# Patient Record
Sex: MALE | Race: BLACK OR AFRICAN AMERICAN | HISPANIC OR LATINO | Employment: FULL TIME | URBAN - METROPOLITAN AREA
[De-identification: names, ages, dates, MRNs, and addresses within clinical notes are randomized per-mention and may not be internally consistent; named-entity substitution may affect disease eponyms.]

---

## 2020-08-24 ENCOUNTER — HOSPITAL ENCOUNTER (EMERGENCY)
Facility: HOSPITAL | Age: 55
Discharge: HOME | End: 2020-08-24
Attending: EMERGENCY MEDICINE
Payer: OTHER GOVERNMENT

## 2020-08-24 VITALS
BODY MASS INDEX: 30.16 KG/M2 | DIASTOLIC BLOOD PRESSURE: 68 MMHG | RESPIRATION RATE: 18 BRPM | HEART RATE: 77 BPM | WEIGHT: 235 LBS | SYSTOLIC BLOOD PRESSURE: 130 MMHG | HEIGHT: 74 IN | TEMPERATURE: 99 F | OXYGEN SATURATION: 97 %

## 2020-08-24 DIAGNOSIS — U07.1 COVID-19 VIRUS DETECTED: ICD-10-CM

## 2020-08-24 DIAGNOSIS — R53.83 FATIGUE: ICD-10-CM

## 2020-08-24 DIAGNOSIS — U07.1 COVID-19 VIRUS INFECTION: Primary | ICD-10-CM

## 2020-08-24 LAB
ALBUMIN SERPL BCP-MCNC: 4.1 G/DL (ref 3.5–5.2)
ALP SERPL-CCNC: 56 U/L (ref 55–135)
ALT SERPL W/O P-5'-P-CCNC: 20 U/L (ref 10–44)
ANION GAP SERPL CALC-SCNC: 10 MMOL/L (ref 8–16)
AST SERPL-CCNC: 28 U/L (ref 10–40)
BASOPHILS # BLD AUTO: 0.01 K/UL (ref 0–0.2)
BASOPHILS NFR BLD: 0.3 % (ref 0–1.9)
BILIRUB SERPL-MCNC: 0.3 MG/DL (ref 0.1–1)
BILIRUB UR QL STRIP: NEGATIVE
BUN SERPL-MCNC: 10 MG/DL (ref 6–20)
CALCIUM SERPL-MCNC: 8.5 MG/DL (ref 8.7–10.5)
CHLORIDE SERPL-SCNC: 107 MMOL/L (ref 95–110)
CK SERPL-CCNC: 464 U/L (ref 20–200)
CLARITY UR: CLEAR
CO2 SERPL-SCNC: 21 MMOL/L (ref 23–29)
COLOR UR: YELLOW
CREAT SERPL-MCNC: 1.2 MG/DL (ref 0.5–1.4)
DIFFERENTIAL METHOD: ABNORMAL
EOSINOPHIL # BLD AUTO: 0 K/UL (ref 0–0.5)
EOSINOPHIL NFR BLD: 0.9 % (ref 0–8)
ERYTHROCYTE [DISTWIDTH] IN BLOOD BY AUTOMATED COUNT: 13.7 % (ref 11.5–14.5)
EST. GFR  (AFRICAN AMERICAN): >60 ML/MIN/1.73 M^2
EST. GFR  (NON AFRICAN AMERICAN): >60 ML/MIN/1.73 M^2
GLUCOSE SERPL-MCNC: 99 MG/DL (ref 70–110)
GLUCOSE UR QL STRIP: NEGATIVE
HCT VFR BLD AUTO: 43.7 % (ref 40–54)
HGB BLD-MCNC: 15.1 G/DL (ref 14–18)
HGB UR QL STRIP: NEGATIVE
IMM GRANULOCYTES # BLD AUTO: 0.01 K/UL (ref 0–0.04)
IMM GRANULOCYTES NFR BLD AUTO: 0.3 % (ref 0–0.5)
KETONES UR QL STRIP: NEGATIVE
LEUKOCYTE ESTERASE UR QL STRIP: NEGATIVE
LYMPHOCYTES # BLD AUTO: 0.7 K/UL (ref 1–4.8)
LYMPHOCYTES NFR BLD: 22.5 % (ref 18–48)
MAGNESIUM SERPL-MCNC: 2.2 MG/DL (ref 1.6–2.6)
MCH RBC QN AUTO: 32.8 PG (ref 27–31)
MCHC RBC AUTO-ENTMCNC: 34.6 G/DL (ref 32–36)
MCV RBC AUTO: 95 FL (ref 82–98)
MICROSCOPIC COMMENT: NORMAL
MONOCYTES # BLD AUTO: 0.6 K/UL (ref 0.3–1)
MONOCYTES NFR BLD: 18.2 % (ref 4–15)
NEUTROPHILS # BLD AUTO: 1.9 K/UL (ref 1.8–7.7)
NEUTROPHILS NFR BLD: 57.8 % (ref 38–73)
NITRITE UR QL STRIP: NEGATIVE
NRBC BLD-RTO: 0 /100 WBC
PH UR STRIP: 5 [PH] (ref 5–8)
PHOSPHATE SERPL-MCNC: 2.7 MG/DL (ref 2.7–4.5)
PLATELET # BLD AUTO: 160 K/UL (ref 150–350)
PMV BLD AUTO: 10 FL (ref 9.2–12.9)
POTASSIUM SERPL-SCNC: 4.5 MMOL/L (ref 3.5–5.1)
PROT SERPL-MCNC: 7.2 G/DL (ref 6–8.4)
PROT UR QL STRIP: NEGATIVE
RBC # BLD AUTO: 4.61 M/UL (ref 4.6–6.2)
SARS-COV-2 RDRP RESP QL NAA+PROBE: POSITIVE
SODIUM SERPL-SCNC: 138 MMOL/L (ref 136–145)
SP GR UR STRIP: 1.01 (ref 1–1.03)
TROPONIN I SERPL DL<=0.01 NG/ML-MCNC: <0.006 NG/ML (ref 0–0.03)
URN SPEC COLLECT METH UR: NORMAL
UROBILINOGEN UR STRIP-ACNC: NEGATIVE EU/DL
WBC # BLD AUTO: 3.29 K/UL (ref 3.9–12.7)

## 2020-08-24 PROCEDURE — 83735 ASSAY OF MAGNESIUM: CPT

## 2020-08-24 PROCEDURE — 81000 URINALYSIS NONAUTO W/SCOPE: CPT

## 2020-08-24 PROCEDURE — U0002 COVID-19 LAB TEST NON-CDC: HCPCS

## 2020-08-24 PROCEDURE — 82550 ASSAY OF CK (CPK): CPT

## 2020-08-24 PROCEDURE — 80053 COMPREHEN METABOLIC PANEL: CPT

## 2020-08-24 PROCEDURE — 96360 HYDRATION IV INFUSION INIT: CPT

## 2020-08-24 PROCEDURE — 93010 ELECTROCARDIOGRAM REPORT: CPT | Mod: ,,, | Performed by: INTERNAL MEDICINE

## 2020-08-24 PROCEDURE — 84484 ASSAY OF TROPONIN QUANT: CPT

## 2020-08-24 PROCEDURE — 25000003 PHARM REV CODE 250: Performed by: EMERGENCY MEDICINE

## 2020-08-24 PROCEDURE — 99285 EMERGENCY DEPT VISIT HI MDM: CPT | Mod: 25

## 2020-08-24 PROCEDURE — 93005 ELECTROCARDIOGRAM TRACING: CPT

## 2020-08-24 PROCEDURE — 93010 EKG 12-LEAD: ICD-10-PCS | Mod: ,,, | Performed by: INTERNAL MEDICINE

## 2020-08-24 PROCEDURE — 85025 COMPLETE CBC W/AUTO DIFF WBC: CPT

## 2020-08-24 PROCEDURE — 84100 ASSAY OF PHOSPHORUS: CPT

## 2020-08-24 RX ORDER — ACETAMINOPHEN 500 MG
1000 TABLET ORAL EVERY 6 HOURS PRN
Qty: 60 TABLET | Refills: 0 | Status: SHIPPED | OUTPATIENT
Start: 2020-08-24

## 2020-08-24 RX ORDER — ACETAMINOPHEN 325 MG/1
650 TABLET ORAL
Status: COMPLETED | OUTPATIENT
Start: 2020-08-24 | End: 2020-08-24

## 2020-08-24 RX ADMIN — SODIUM CHLORIDE 1000 ML: 0.9 INJECTION, SOLUTION INTRAVENOUS at 07:08

## 2020-08-24 RX ADMIN — ACETAMINOPHEN 650 MG: 325 TABLET ORAL at 07:08

## 2020-08-24 NOTE — ED PROVIDER NOTES
"Encounter Date: 8/24/2020    SCRIBE #1 NOTE: I, Grazyna Garcia, am scribing for, and in the presence of,  Kayden Atkinson MD. I have scribed the following portions of the note - Other sections scribed: HPI, ROS, PE.       History     Chief Complaint   Patient presents with    Fever     Pt reports he "overheated" at work Saturday. Pt c/o dizziness, decreased urine output, dark urine. Also reports fever/chills at night. Temp 101.9 in triage. Pain is 6/10     Problem     This is a 54 y.o male presenting to the ED for an evaluation for acute onset, constant decreased urine output with associated dark colored urine that began 2 days ago.  Patient also reports of intermittent headaches.  He states his symptoms began after becoming overheated while at work 2 days ago.  He reports working in a warm environment and reports wearing a lot of protective gearing.  He notes while at work he had an episode of dizziness and sweating, which he states has since resolved.  He denies cough, rhinorrhea, rash, abdominal pain, nausea, emesis, diarrhea, or any other associated symptoms.  No prior tx.  No alleviating factors.    The history is provided by the patient.     Review of patient's allergies indicates:  No Known Allergies  No past medical history on file.  No past surgical history on file.  No family history on file.  Social History     Tobacco Use    Smoking status: Not on file   Substance Use Topics    Alcohol use: Not on file    Drug use: Not on file     Review of Systems   Constitutional: Negative.    HENT: Negative.    Eyes: Negative.    Respiratory: Negative.    Cardiovascular: Negative.    Gastrointestinal: Negative.    Genitourinary: Positive for decreased urine volume.        (+) dark colored urine   Musculoskeletal: Negative.    Skin: Negative.    Neurological: Positive for headaches.   Psychiatric/Behavioral: Negative.        Physical Exam     Initial Vitals [08/24/20 0710]   BP Pulse Resp Temp SpO2   (!) 140/74 " 88 18 (!) 101.9 °F (38.8 °C) 97 %      MAP       --         Physical Exam    Nursing note and vitals reviewed.  Constitutional: He appears well-developed and well-nourished. He is not diaphoretic. He appears distressed (mildly).   HENT:   Head: Normocephalic and atraumatic.   Nose: Nose normal.   Mouth/Throat: No oropharyngeal exudate.   Eyes: EOM are normal. Pupils are equal, round, and reactive to light.   Neck: Normal range of motion. Neck supple. No tracheal deviation present. No JVD present.   Cardiovascular: Normal rate, regular rhythm and normal heart sounds.   No murmur heard.  Pulmonary/Chest: Breath sounds normal. No respiratory distress. He has no wheezes. He has no rhonchi. He has no rales.   Abdominal: Soft. Bowel sounds are normal. He exhibits no distension. There is no abdominal tenderness. There is no rebound and no guarding.   Musculoskeletal: Normal range of motion. No tenderness or edema.   Neurological: He is alert and oriented to person, place, and time. He has normal strength. No cranial nerve deficit.   Skin: Skin is warm and dry. Capillary refill takes less than 2 seconds. No rash noted. No erythema.         ED Course   Procedures  Labs Reviewed   CBC W/ AUTO DIFFERENTIAL - Abnormal; Notable for the following components:       Result Value    WBC 3.29 (*)     Mean Corpuscular Hemoglobin 32.8 (*)     Lymph # 0.7 (*)     Mono% 18.2 (*)     All other components within normal limits   COMPREHENSIVE METABOLIC PANEL - Abnormal; Notable for the following components:    CO2 21 (*)     Calcium 8.5 (*)     All other components within normal limits   SARS-COV-2 RNA AMPLIFICATION, QUAL - Abnormal; Notable for the following components:    SARS-CoV-2 RNA, Amplification, Qual Positive (*)     All other components within normal limits   CK - Abnormal; Notable for the following components:     (*)     All other components within normal limits   URINALYSIS, REFLEX TO URINE CULTURE    Narrative:      Specimen Source->Urine   TROPONIN I   MAGNESIUM   PHOSPHORUS   URINALYSIS MICROSCOPIC    Narrative:     Specimen Source->Urine        ECG Results          EKG 12-lead (In process)  Result time 08/24/20 12:33:49    In process by Interface, Lab In [facility] (08/24/20 12:33:49)                 Narrative:    Test Reason : R53.83,    Vent. Rate : 089 BPM     Atrial Rate : 089 BPM     P-R Int : 232 ms          QRS Dur : 072 ms      QT Int : 322 ms       P-R-T Axes : 068 078 058 degrees     QTc Int : 391 ms    Sinus rhythm with 1st degree A-V block  Otherwise normal ECG  No previous ECGs available    Referred By: [email or web address]   SELF           Confirmed By:                   In process by Interface, Lab In [facility] (08/24/20 12:32:56)                 Narrative:    Test Reason : R53.83,    Vent. Rate : 089 BPM     Atrial Rate : 089 BPM     P-R Int : 232 ms          QRS Dur : 072 ms      QT Int : 322 ms       P-R-T Axes : 068 078 058 degrees     QTc Int : 391 ms    Sinus rhythm with 1st degree A-V block  Otherwise normal ECG  No previous ECGs available    Referred By: [email or web address]   SELF           Confirmed By:                             Imaging Results          X-Ray Chest 1 View (Final result)  Result time 08/24/20 08:00:21    Final result by Garfield Concepcion MD (08/24/20 08:00:21)                 Impression:      No acute abnormality.      Electronically signed by: Garfield Concepcion MD  Date:    08/24/2020  Time:    08:00             Narrative:    EXAMINATION:  XR CHEST 1 VIEW    CLINICAL HISTORY:  Chest Pain;    TECHNIQUE:  Single frontal view of the chest was performed.    COMPARISON:  None    FINDINGS:  Lungs are clear.  No effusion or pneumothorax.    No acute bone abnormality.                                            Scribe Attestation:   Scribe #1: I performed the above scribed service and the documentation accurately describes the services I performed. I attest to the accuracy of the note.            ED Course as of Aug 25  0927   Mon Aug 24, 2020   0732 EKG-720-normal sinus rhythm rate of 89 beats per minute, no ischemic pattern noted, 1st degree AV block, no STEMI.    [BB]      ED Course User Index  [BB] Kayden Atkinson MD       MDM:    54 y.o.male with PMHx as noted above presents with fever, dizziness, fatigue. Physical exam remarkable for mildly distressed appearing male, conversing with ease, no peripheral edema noted, abdomen soft, nt/nd, RRR, in no overt respiratory distress.  ED workup remarkable for CBC/CMP wnl, trop neg, COVID positive, CXR - unremarkable, .  Pt presentation consistent with COVID19 infection, with symptoms consistent as noted above, symptoms of mild dehydration mildly elevated CPK here without evidence of acute KENYA or acute renal failure.  Given IV fluids and Tylenol with symptomatic improvement.  At this time given patient's history, physical exam, and ED workup do not suspect acute PE, acute MI/ACS, PTX, CHF/COPD exacerbation, bacterial PNA, septic shock, acute respiratory failure, or any further malignant cause. Pt advised on conservative therapies at home including tylenol, zofran, and increased PO fluids.  At time of reassessment patient in no acute respiratory distress, not meeting criteria for hospitalization or further oxygen administration. Discussed diagnosis and further treatment with patient, including f/u with PCP in the next few days.  Return precautions given and all questions answered.  Patient in understanding of plan, including plans for home quarantine.  Pt discharged to home improved and stable.             Clinical Impression:       ICD-10-CM ICD-9-CM   1. COVID-19 virus infection  U07.1    2. Fatigue  R53.83 780.79             ED Disposition Condition    Discharge Stable        ED Prescriptions     Medication Sig Dispense Start Date End Date Auth. Provider    acetaminophen (TYLENOL) 500 MG tablet Take 2 tablets (1,000 mg total) by mouth every 6 (six) hours as needed. 60  tablet 8/24/2020  Kayden Atkinson MD        Follow-up Information     Follow up With Specialties Details Why Contact Info    [facility] [place] [facility] Go to  If symptoms worsen [address] [place] [address] [phone]                            I, Kayden Atkinson M.D., personally performed the services described in this documentation. All medical record entries made by the scribe were at my direction and in my presence. I have reviewed the chart and agree that the record reflects my personal performance and is accurate and complete.         Kayden Atkinson MD  08/25/20 0925

## 2020-08-24 NOTE — ED TRIAGE NOTES
Pt presents to ED c/o being overheated on Saturday.  States he's been dizzy.  Since then his urine has been dark brown per pt. Also feels weak.  Denies chest pains.

## 2020-08-24 NOTE — Clinical Note
Erika River was seen and treated in our emergency department on 8/24/2020.  He may return to work on 09/03/2020.       If you have any questions or concerns, please don't hesitate to call.      Kayden Atkinson MD

## 2022-09-29 DIAGNOSIS — G44.209 TENSION-TYPE HEADACHE: Primary | ICD-10-CM

## 2022-11-07 DIAGNOSIS — G44.209 TENSION-TYPE HEADACHE: Primary | ICD-10-CM

## 2022-11-07 DIAGNOSIS — S16.1XXA NECK STRAIN: ICD-10-CM

## 2022-11-14 DIAGNOSIS — G44.209 TENSION HEADACHE: Primary | ICD-10-CM
